# Patient Record
Sex: FEMALE | Race: OTHER | Employment: STUDENT | URBAN - METROPOLITAN AREA
[De-identification: names, ages, dates, MRNs, and addresses within clinical notes are randomized per-mention and may not be internally consistent; named-entity substitution may affect disease eponyms.]

---
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
## 2025-06-28 ENCOUNTER — HOSPITAL ENCOUNTER (OUTPATIENT)
Age: 20
Discharge: HOME | End: 2025-06-28
Payer: MEDICAID

## 2025-06-28 VITALS
DIASTOLIC BLOOD PRESSURE: 63 MMHG | RESPIRATION RATE: 18 BRPM | OXYGEN SATURATION: 98 % | SYSTOLIC BLOOD PRESSURE: 120 MMHG | HEART RATE: 70 BPM | TEMPERATURE: 98 F

## 2025-06-28 DIAGNOSIS — N92.6 IRREGULAR MENSES: Primary | ICD-10-CM

## 2025-06-28 LAB
B-HCG UR QL: NEGATIVE
BILIRUB UR QL STRIP: NEGATIVE
CLARITY UR: CLEAR
COLOR UR: YELLOW
GLUCOSE UR STRIP-MCNC: NEGATIVE MG/DL
KETONES UR STRIP-MCNC: NEGATIVE MG/DL
LEUKOCYTE ESTERASE UR QL STRIP: NEGATIVE
NITRITE UR QL STRIP: NEGATIVE
PH UR STRIP: 6.5 [PH]
PROT UR STRIP-MCNC: NEGATIVE MG/DL
SP GR UR STRIP: 1.01
UROBILINOGEN UR STRIP-ACNC: <2 MG/DL

## 2025-06-28 PROCEDURE — 99213 OFFICE O/P EST LOW 20 MIN: CPT | Performed by: PHYSICIAN ASSISTANT

## 2025-06-28 PROCEDURE — 81025 URINE PREGNANCY TEST: CPT | Performed by: PHYSICIAN ASSISTANT

## 2025-06-28 PROCEDURE — 81002 URINALYSIS NONAUTO W/O SCOPE: CPT | Performed by: PHYSICIAN ASSISTANT

## 2025-06-28 NOTE — ED INITIAL ASSESSMENT (HPI)
Patient has been sexually active with one partner. She had a period starting 6/8/25 and then today has been having vaginal bleeding with minor cramping. The patient is concerned about pregnancy due to not having used protection. She has usually irregular periods, but not typically this close together.

## 2025-06-28 NOTE — DISCHARGE INSTRUCTIONS
As we discussed this vaginal bleeding outside of the normal timeframe for your menstrual cycles is most likely related to the Plan B that you took earlier this week.  Continue to follow-up with a gynecologist as previously expressed  Return to ER symptoms worsen

## 2025-06-28 NOTE — ED PROVIDER NOTES
Patient Seen in: [facility]        History  Chief Complaint   Patient presents with    Vaginal Bleeding     Stated Complaint: MEKA ECHEVERRIA    Subjective:   The history is provided by the patient.               20 yo female presents to the immediate care due to abnormal vaginal bleeding.  Patient states that her last menstrual cycle was at the beginning of this month and normal.  She began to have vaginal bleeding again starting today.  Some cramping consistent with her period.  Concerned she is pregnant.  She is currently sexually active was not using protection.  She had a recent evaluation on 06/03 -at that time pregnancy test was negative.  She was also tested for gonorrhea and chlamydia which was negative as well.  Patient denies any abnormal vaginal discharge.  No significant pelvic pain.  This bleeding and cramping is consistent with her periods but her periods are usually every 42 days instead of early.  Admits to taking Plan B over-the-counter over the last week out of concern for pregnancy.  Was unaware that this could cause changes in her menstrual cycle.  Also evaluated by her primary care doctor yesterday-gynecology referral was given due to her abnormal periods and for potential future birth control.      Objective:     Past Medical History:    Asthma (HCC)    sports induced asthma              History reviewed. No pertinent surgical history.             Social History     Socioeconomic History    Marital status: Single   Tobacco Use    Smoking status: Never    Smokeless tobacco: Never   Substance and Sexual Activity    Alcohol use: No    Drug use: No     Social Drivers of Health     Food Insecurity: Food Insecurity Present (8/6/2024)    [facility]    Hunger Vital Sign     Worried About Running Out of Food in the Last Year: Often true     Ran Out of Food in the Last Year: Sometimes true   Transportation Needs: No Transportation Needs (8/6/2024)     Re[facility] from [facility]    PRAPARE - Transportation     Lack of Transportation (Medical): No     Lack of Transportation (Non-Medical): No   Housing Stability: High Risk (8/6/2024)    Received from [facility]    Housing Stability Vital Sign     Unable to Pay for Housing in the Last Year: Yes     Number of Times Moved in the Last Year: 0     Homeless in the Last Year: No              Review of Systems   Constitutional: Negative.    Respiratory: Negative.     Cardiovascular: Negative.    Gastrointestinal: Negative.    Genitourinary:  Positive for menstrual problem and vaginal bleeding. Negative for dysuria, pelvic pain and vaginal discharge.       Positive for stated complaint: EVAL G  Other systems are as noted in HPI.  Constitutional and vital signs reviewed.      All other systems reviewed and negative except as noted above.                  Physical Exam    ED Triage Vitals [06/28/25 1405]   /63   Pulse 70   Resp 18   Temp 98 °F (36.7 °C)   Temp src Oral   SpO2 98 %   O2 Device None (Room air)       Current Vitals:   Vital Signs  BP: 120/63  Pulse: 70  Resp: 18  Temp: 98 °F (36.7 °C)  Temp src: Oral    Oxygen Therapy  SpO2: 98 %  O2 Device: None (Room air)            Physical Exam  Vitals and nursing note reviewed.   Constitutional:       General: She is not in acute distress.     Appearance: Normal appearance.   Cardiovascular:      Rate and Rhythm: Normal rate and regular rhythm.   Pulmonary:      Effort: Pulmonary effort is normal.      Breath sounds: Normal breath sounds.   Abdominal:      General: Bowel sounds are normal. There is no distension.      Palpations: Abdomen is soft.      Tenderness: There is no abdominal tenderness. There is no guarding.   Genitourinary:     Comments: Deferred  Skin:     General: Skin is warm.   Neurological:      General: No focal deficit present.      Mental Status: She is alert and oriented to person,  place, and time.   Psychiatric:         Mood and Affect: Mood normal.         Behavior: Behavior normal.                 ED Course  Labs Reviewed   [facility] POCT URINALYSIS DIPSTICK - Abnormal; Notable for the following components:       Result Value    Blood, Urine Small (*)     All other components within normal limits   POCT PREGNANCY URINE - Normal                            MDM  Ddx -dysmenorrhea, pregnancy, PCOS, uterine fibroids    On exam the patient is afebrile nontoxic.  Her vital signs are stable.  Her abdomen is soft nontender.  She is in no acute distress.  Her pregnancy is negative her urine dip is unremarkable.  She is already been tested for gonorrhea chlamydia this week and normal.  I honestly believe her abnormal vaginal bleeding is related to taking Plan B.  Patient is already scheduled to follow-up with a gynecologist this week due to abnormal menstrual cycles.  Advised to keep this appointment.  Advised to avoid sexual activity until further evaluation.  Patient voiced understanding        Medical Decision Making  Problems Addressed:  Irregular menses: acute illness or injury    Amount and/or Complexity of Data Reviewed  Labs: ordered. Decision-making details documented in ED Course.        Disposition and Plan     Clinical Impression:  1. Irregular menses         Disposition:  Discharge  6/28/2025  2:34 pm    Follow-up:  No follow-up provider specified.        Medications Prescribed:  Discharge Medication List as of 6/28/2025  2:45 PM                Supplementary Documentation:

## (undated) NOTE — MR AVS SNAPSHOT
[facility] [place]  [address] [facility] [address] [phone]               Thank you for choosing us for your health care visit with KANNAN Davis. We are glad to serve you and happy to provide you with this summary of your visit.   Please Woto access allows you to view health information for your child from their recent   visit, view other health information and more. To sign up or find more information on getting   Proxy Access to your child’s [facility] go to [email or web address]

## (undated) NOTE — MR AVS SNAPSHOT
[facility] [place]  [address] [facility] [address]  [phone]               Thank you for choosing us for your health care visit with Fernando Canas DO. We are glad to serve you and happy to provide you with this summary of your visit.   Pl Measure temperature before ingestion. Take your anti inflammatory medicine, ibuprofen, with food, stop and call if GI side effects. Your are at risk of GI upset, stomach ulcer, gastrointestional bleeding while taking anti-inflammatory medications.  If you (formula or breast). Talk with your child’s healthcare provider to find out how much fluid your baby should be getting. If needed, give an oral rehydration solution. You can buy this at the grocery or drugstore without a prescription.  For a child older ace baby. Jazmine Barron may put 2 to 3 drops of saltwater (saline) nose drops in each nostril before suctioning. This will help remove secretions. You can buy saline nose drops without a prescription.  You can make the drops yourself by adding 1/4 teaspoon table salt to \"sunken\" eyes or dry mouth. It may also be no wet diapers for 8 hours in a baby. Or it may be less urine than usual in older children.   · Rash with fever  Date Last Reviewed: 12/23/2014  © 7123-4200 The [address] [place], [place] [address]Anders child. For babies, inhaled medicine is often given with a machine called a nebulizer. This uses a face mask to help a young child breathe in the medicine.   General care  · If your child has an inhaler, learn how to check the amount of medicine in the canis Call your child's healthcare provider right away if any of these occur:  · Asthma attacks that increase in frequency or severity  · Trouble breathing that is not relieved by the medicines prescribed for your child for an acute asthma attack  · Your child n mouthpiece that can be attached to the inhaler. It helps more medicine gets into the lungs. To use an inhaler with a spacer, follow the package instructions.  If you have questions about the right way to use the inhaler, ask your child’s healthcare provider 103 lb 12.8 oz (50 %*, Z = -0.01) 20.95 kg/m2 (73 %*, Z = 0.61)    *Growth percentiles are based on CDC 2-20 Years data     BP percentiles are based on 2000 NHANES data         Current Medications          This list is accurate as of: 2/23/17  8:38 AM.  A their recent   visit, view other health information and more. To sign up or find more information on getting   Proxy Access to your child’s MyChart go to [email or web address] and click on the   Sign Up Forms link in the Additional Information box o Eating low-fat dairy products like yogurt, milk, and cheese  o Regularly eating meals together as a family  o Limiting fast food, take out food, and eating out at restaurants  o Preparing foods at home as a family  o Eating a diet rich in calcium  o [address] [place] [address]

## (undated) NOTE — MR AVS SNAPSHOT
[facility] [place]  [address] [facility] [id number] [phone]               Thank you for choosing us for your health care visit with Karen Franco DO. We are glad to serve you and happy to provide you with this summary of your visit.   Pl A partial list of symptoms common to attention deficit and hyperactivity disorder appears below. Your child may show traits from one or both groups.   Attention deficit  · Lacks mental focus  · Performs inconsistently  · Is distracted easily  · Has trouble Depression  A depressed child may feel sad most of the time. He or she may have low self-esteem and show little interest in life. The child may eat or sleep more or less than in the past. He or she may withdraw from the rest of the world.   Anxiety  It is n easier it is to carry out, the more danger the person is in right now. For more information  Contact a local mental health clinic or the following:  · National Suicide Prevention Xusbyzjj518-279-7640 (WeekendScores.is. org  · Shama Alvarenga · Complain of stomachaches or other pains that can’t be explained. Depression in teens  It can be hard to spot depression in teens. It’s normal for them to have extreme mood swings. This is the result of their changing hormones.  It’s also just part of [place] 104/52 mmHg (43 %, Z = -0.17 / 16 %, Z = -0.99*) 88 98.2 °F (36.8 °C) 104 lb (49 %*, Z = -0.04)      *Growth percentiles are based on CDC 2-20 Years data     BP percentiles are based on 2000 NHANES data         Current Medications          This list is ac

## (undated) NOTE — ED AVS SNAPSHOT
[facility] Emergency Department    [place]  [address] [address]    Phone:  [phone]    Fax:  [phone]           Jaci Hardy   MRN: [id number]    Department:  [facility] Emergency Department   Date of Visit:  2/2 Discharge References/Attachments     INFLUENZA (CHILD) (ENGLISH)    VOMITING (ADULT) (ENGLISH)    DIZZINESS, UNCERTAIN CAUSE (ENGLISH)      Disclosure     Insurance plans vary and the physician(s) referred by the ER may not be covered by your plan.  Please CARE PHYSICIAN AT ONCE OR RETURN IMMEDIATELY TO THE EMERGENCY DEPARTMENT.     If you have been prescribed any medication(s), please fill your prescription right away and begin taking the medication(s) as directed    If the emergency physician has read X-ray coverage. Patient [address] is a Federal Navigator program that can help with your Affordable Care Act coverage, as well as all types of Medicaid plans.   To get signed up and covered, please call [phone] and ask to get set up for an insuran

## (undated) NOTE — Clinical Note
March 20, 2017        [facility] - [address], [facility] [address] [facility] [address] [facility] [address]    Dear Ravinder Montenegro: According to our records you did not show up for your scheduled appointment on 3/20/17.     We would like to inform you that for

## (undated) NOTE — ED AVS SNAPSHOT
[facility] Emergency Department    [place]  [address] [address]    Phone:  [phone]    Fax:  [phone]           Shira Gonzalez   MRN: [id number]    Department:  [facility] Emergency Department   Date of Visit:  2/2 IF THERE IS ANY CHANGE OR WORSENING OF YOUR CONDITION, CALL YOUR PRIMARY CARE PHYSICIAN AT ONCE OR RETURN IMMEDIATELY TO THE EMERGENCY DEPARTMENT.     If you have been prescribed any medication(s), please fill your prescription right away and begin taking t

## (undated) NOTE — LETTER
[employer] PLAN for Omid Villagomez     : 10/12/2005     Date: 2017  Provider:  Lurdes Reyes DO  Phone for doctor or clinic: [facility] [facility] [address] [phone]    ACT Score: 22      You can the phone and mailed copy to patient or submitted via myDocket.      Signatures:  Provider  Lolis Vidal, DO   Patient Caretaker

## (undated) NOTE — LETTER
[address] [facility], [address] [place] [address]  [phone]            February 21, 2018      Dawit Dale  [address] [facility] [place] [id number]      Dear Linda Zamudio:     Our office has been trying to contact you t